# Patient Record
Sex: FEMALE | Race: WHITE | NOT HISPANIC OR LATINO | URBAN - METROPOLITAN AREA
[De-identification: names, ages, dates, MRNs, and addresses within clinical notes are randomized per-mention and may not be internally consistent; named-entity substitution may affect disease eponyms.]

---

## 2017-05-05 ENCOUNTER — EMERGENCY (EMERGENCY)
Facility: HOSPITAL | Age: 37
LOS: 0 days | Discharge: HOME | End: 2017-05-05

## 2017-06-13 ENCOUNTER — EMERGENCY (EMERGENCY)
Facility: HOSPITAL | Age: 37
LOS: 0 days | Discharge: HOME | End: 2017-06-13

## 2017-06-28 DIAGNOSIS — F17.200 NICOTINE DEPENDENCE, UNSPECIFIED, UNCOMPLICATED: ICD-10-CM

## 2017-06-28 DIAGNOSIS — R10.31 RIGHT LOWER QUADRANT PAIN: ICD-10-CM

## 2017-06-28 DIAGNOSIS — N83.201 UNSPECIFIED OVARIAN CYST, RIGHT SIDE: ICD-10-CM

## 2017-06-30 DIAGNOSIS — N83.201 UNSPECIFIED OVARIAN CYST, RIGHT SIDE: ICD-10-CM

## 2017-06-30 DIAGNOSIS — R10.31 RIGHT LOWER QUADRANT PAIN: ICD-10-CM

## 2017-06-30 DIAGNOSIS — Z87.891 PERSONAL HISTORY OF NICOTINE DEPENDENCE: ICD-10-CM

## 2017-06-30 DIAGNOSIS — Z98.890 OTHER SPECIFIED POSTPROCEDURAL STATES: ICD-10-CM

## 2018-01-04 ENCOUNTER — EMERGENCY (EMERGENCY)
Facility: HOSPITAL | Age: 38
LOS: 0 days | Discharge: HOME | End: 2018-01-05

## 2018-01-04 DIAGNOSIS — Z87.42 PERSONAL HISTORY OF OTHER DISEASES OF THE FEMALE GENITAL TRACT: ICD-10-CM

## 2018-01-04 DIAGNOSIS — N83.201 UNSPECIFIED OVARIAN CYST, RIGHT SIDE: ICD-10-CM

## 2018-01-04 DIAGNOSIS — R10.2 PELVIC AND PERINEAL PAIN: ICD-10-CM

## 2018-01-04 DIAGNOSIS — Z98.890 OTHER SPECIFIED POSTPROCEDURAL STATES: ICD-10-CM

## 2018-01-04 DIAGNOSIS — Z90.49 ACQUIRED ABSENCE OF OTHER SPECIFIED PARTS OF DIGESTIVE TRACT: ICD-10-CM

## 2018-01-04 DIAGNOSIS — Z90.721 ACQUIRED ABSENCE OF OVARIES, UNILATERAL: ICD-10-CM

## 2018-09-22 ENCOUNTER — INPATIENT (INPATIENT)
Facility: HOSPITAL | Age: 38
LOS: 0 days | Discharge: HOME | End: 2018-09-23
Attending: OBSTETRICS & GYNECOLOGY | Admitting: OBSTETRICS & GYNECOLOGY

## 2018-09-22 VITALS
RESPIRATION RATE: 18 BRPM | TEMPERATURE: 97 F | HEART RATE: 90 BPM | OXYGEN SATURATION: 99 % | DIASTOLIC BLOOD PRESSURE: 93 MMHG | SYSTOLIC BLOOD PRESSURE: 193 MMHG

## 2018-09-22 DIAGNOSIS — Z90.721 ACQUIRED ABSENCE OF OVARIES, UNILATERAL: Chronic | ICD-10-CM

## 2018-09-22 DIAGNOSIS — N83.209 UNSPECIFIED OVARIAN CYST, UNSPECIFIED SIDE: ICD-10-CM

## 2018-09-22 DIAGNOSIS — Z98.891 HISTORY OF UTERINE SCAR FROM PREVIOUS SURGERY: Chronic | ICD-10-CM

## 2018-09-22 DIAGNOSIS — Z90.49 ACQUIRED ABSENCE OF OTHER SPECIFIED PARTS OF DIGESTIVE TRACT: Chronic | ICD-10-CM

## 2018-09-22 DIAGNOSIS — Z98.890 OTHER SPECIFIED POSTPROCEDURAL STATES: Chronic | ICD-10-CM

## 2018-09-22 LAB
ALBUMIN SERPL ELPH-MCNC: 4.4 G/DL — SIGNIFICANT CHANGE UP (ref 3.5–5.2)
ALP SERPL-CCNC: 61 U/L — SIGNIFICANT CHANGE UP (ref 30–115)
ALT FLD-CCNC: 12 U/L — SIGNIFICANT CHANGE UP (ref 0–41)
ANION GAP SERPL CALC-SCNC: 16 MMOL/L — HIGH (ref 7–14)
ANISOCYTOSIS BLD QL: SLIGHT — SIGNIFICANT CHANGE UP
APPEARANCE UR: CLEAR — SIGNIFICANT CHANGE UP
APTT BLD: 27.3 SEC — SIGNIFICANT CHANGE UP (ref 27–39.2)
AST SERPL-CCNC: 15 U/L — SIGNIFICANT CHANGE UP (ref 0–41)
BASOPHILS # BLD AUTO: 0.05 K/UL — SIGNIFICANT CHANGE UP (ref 0–0.2)
BASOPHILS NFR BLD AUTO: 0.4 % — SIGNIFICANT CHANGE UP (ref 0–1)
BILIRUB SERPL-MCNC: 0.3 MG/DL — SIGNIFICANT CHANGE UP (ref 0.2–1.2)
BILIRUB UR-MCNC: NEGATIVE — SIGNIFICANT CHANGE UP
BUN SERPL-MCNC: 9 MG/DL — LOW (ref 10–20)
CALCIUM SERPL-MCNC: 8.9 MG/DL — SIGNIFICANT CHANGE UP (ref 8.5–10.1)
CHLORIDE SERPL-SCNC: 101 MMOL/L — SIGNIFICANT CHANGE UP (ref 98–110)
CO2 SERPL-SCNC: 23 MMOL/L — SIGNIFICANT CHANGE UP (ref 17–32)
COLOR SPEC: YELLOW — SIGNIFICANT CHANGE UP
CREAT SERPL-MCNC: 0.6 MG/DL — LOW (ref 0.7–1.5)
DACRYOCYTES BLD QL SMEAR: SLIGHT — SIGNIFICANT CHANGE UP
DIFF PNL FLD: NEGATIVE — SIGNIFICANT CHANGE UP
EOSINOPHIL # BLD AUTO: 0.12 K/UL — SIGNIFICANT CHANGE UP (ref 0–0.7)
EOSINOPHIL NFR BLD AUTO: 1.1 % — SIGNIFICANT CHANGE UP (ref 0–8)
GLUCOSE SERPL-MCNC: 108 MG/DL — HIGH (ref 70–99)
GLUCOSE UR QL: NEGATIVE MG/DL — SIGNIFICANT CHANGE UP
HCT VFR BLD CALC: 31.1 % — LOW (ref 37–47)
HCT VFR BLD CALC: 35.4 % — LOW (ref 37–47)
HGB BLD-MCNC: 11 G/DL — LOW (ref 12–16)
HGB BLD-MCNC: 9.8 G/DL — LOW (ref 12–16)
HYPOCHROMIA BLD QL: SLIGHT — SIGNIFICANT CHANGE UP
IMM GRANULOCYTES NFR BLD AUTO: 0.5 % — HIGH (ref 0.1–0.3)
INR BLD: 1.02 RATIO — SIGNIFICANT CHANGE UP (ref 0.65–1.3)
KETONES UR-MCNC: NEGATIVE — SIGNIFICANT CHANGE UP
LEUKOCYTE ESTERASE UR-ACNC: NEGATIVE — SIGNIFICANT CHANGE UP
LYMPHOCYTES # BLD AUTO: 2.55 K/UL — SIGNIFICANT CHANGE UP (ref 1.2–3.4)
LYMPHOCYTES # BLD AUTO: 22.5 % — SIGNIFICANT CHANGE UP (ref 20.5–51.1)
MANUAL SMEAR VERIFICATION: SIGNIFICANT CHANGE UP
MCHC RBC-ENTMCNC: 20.6 PG — LOW (ref 27–31)
MCHC RBC-ENTMCNC: 20.6 PG — LOW (ref 27–31)
MCHC RBC-ENTMCNC: 31.1 G/DL — LOW (ref 32–37)
MCHC RBC-ENTMCNC: 31.5 G/DL — LOW (ref 32–37)
MCV RBC AUTO: 65.5 FL — LOW (ref 81–99)
MCV RBC AUTO: 66.2 FL — LOW (ref 81–99)
MICROCYTES BLD QL: SLIGHT — SIGNIFICANT CHANGE UP
MONOCYTES # BLD AUTO: 0.74 K/UL — HIGH (ref 0.1–0.6)
MONOCYTES NFR BLD AUTO: 6.5 % — SIGNIFICANT CHANGE UP (ref 1.7–9.3)
NEUTROPHILS # BLD AUTO: 7.82 K/UL — HIGH (ref 1.4–6.5)
NEUTROPHILS NFR BLD AUTO: 69 % — SIGNIFICANT CHANGE UP (ref 42.2–75.2)
NITRITE UR-MCNC: NEGATIVE — SIGNIFICANT CHANGE UP
NRBC # BLD: 0 /100 WBCS — SIGNIFICANT CHANGE UP (ref 0–0)
NRBC # BLD: 0 /100 WBCS — SIGNIFICANT CHANGE UP (ref 0–0)
OVALOCYTES BLD QL SMEAR: SLIGHT — SIGNIFICANT CHANGE UP
PH UR: 6 — SIGNIFICANT CHANGE UP (ref 5–8)
PLAT MORPH BLD: NORMAL — SIGNIFICANT CHANGE UP
PLATELET # BLD AUTO: 242 K/UL — SIGNIFICANT CHANGE UP (ref 130–400)
PLATELET # BLD AUTO: 283 K/UL — SIGNIFICANT CHANGE UP (ref 130–400)
POTASSIUM SERPL-MCNC: 4.2 MMOL/L — SIGNIFICANT CHANGE UP (ref 3.5–5)
POTASSIUM SERPL-SCNC: 4.2 MMOL/L — SIGNIFICANT CHANGE UP (ref 3.5–5)
PROT SERPL-MCNC: 7.1 G/DL — SIGNIFICANT CHANGE UP (ref 6–8)
PROT UR-MCNC: NEGATIVE MG/DL — SIGNIFICANT CHANGE UP
PROTHROM AB SERPL-ACNC: 11 SEC — SIGNIFICANT CHANGE UP (ref 9.95–12.87)
RBC # BLD: 4.75 M/UL — SIGNIFICANT CHANGE UP (ref 4.2–5.4)
RBC # BLD: 5.35 M/UL — SIGNIFICANT CHANGE UP (ref 4.2–5.4)
RBC # FLD: 15.4 % — HIGH (ref 11.5–14.5)
RBC # FLD: 15.6 % — HIGH (ref 11.5–14.5)
RBC BLD AUTO: NORMAL — SIGNIFICANT CHANGE UP
SODIUM SERPL-SCNC: 140 MMOL/L — SIGNIFICANT CHANGE UP (ref 135–146)
SP GR SPEC: 1.01 — SIGNIFICANT CHANGE UP (ref 1.01–1.03)
UROBILINOGEN FLD QL: 0.2 MG/DL — SIGNIFICANT CHANGE UP (ref 0.2–0.2)
WBC # BLD: 11.34 K/UL — HIGH (ref 4.8–10.8)
WBC # BLD: 9.19 K/UL — SIGNIFICANT CHANGE UP (ref 4.8–10.8)
WBC # FLD AUTO: 11.34 K/UL — HIGH (ref 4.8–10.8)
WBC # FLD AUTO: 9.19 K/UL — SIGNIFICANT CHANGE UP (ref 4.8–10.8)

## 2018-09-22 RX ORDER — ONDANSETRON 8 MG/1
4 TABLET, FILM COATED ORAL EVERY 8 HOURS
Qty: 0 | Refills: 0 | Status: DISCONTINUED | OUTPATIENT
Start: 2018-09-22 | End: 2018-09-23

## 2018-09-22 RX ORDER — SODIUM CHLORIDE 9 MG/ML
1000 INJECTION INTRAMUSCULAR; INTRAVENOUS; SUBCUTANEOUS
Qty: 0 | Refills: 0 | Status: DISCONTINUED | OUTPATIENT
Start: 2018-09-22 | End: 2018-09-22

## 2018-09-22 RX ORDER — MORPHINE SULFATE 50 MG/1
6 CAPSULE, EXTENDED RELEASE ORAL ONCE
Qty: 0 | Refills: 0 | Status: DISCONTINUED | OUTPATIENT
Start: 2018-09-22 | End: 2018-09-22

## 2018-09-22 RX ORDER — ONDANSETRON 8 MG/1
4 TABLET, FILM COATED ORAL ONCE
Qty: 0 | Refills: 0 | Status: COMPLETED | OUTPATIENT
Start: 2018-09-22 | End: 2018-09-22

## 2018-09-22 RX ORDER — KETOROLAC TROMETHAMINE 30 MG/ML
30 SYRINGE (ML) INJECTION ONCE
Qty: 0 | Refills: 0 | Status: DISCONTINUED | OUTPATIENT
Start: 2018-09-22 | End: 2018-09-22

## 2018-09-22 RX ORDER — SODIUM CHLORIDE 9 MG/ML
1000 INJECTION INTRAMUSCULAR; INTRAVENOUS; SUBCUTANEOUS ONCE
Qty: 0 | Refills: 0 | Status: COMPLETED | OUTPATIENT
Start: 2018-09-22 | End: 2018-09-22

## 2018-09-22 RX ORDER — KETOROLAC TROMETHAMINE 30 MG/ML
30 SYRINGE (ML) INJECTION EVERY 6 HOURS
Qty: 0 | Refills: 0 | Status: DISCONTINUED | OUTPATIENT
Start: 2018-09-22 | End: 2018-09-23

## 2018-09-22 RX ORDER — OXYCODONE AND ACETAMINOPHEN 5; 325 MG/1; MG/1
1 TABLET ORAL EVERY 4 HOURS
Qty: 0 | Refills: 0 | Status: DISCONTINUED | OUTPATIENT
Start: 2018-09-22 | End: 2018-09-23

## 2018-09-22 RX ADMIN — ONDANSETRON 4 MILLIGRAM(S): 8 TABLET, FILM COATED ORAL at 20:11

## 2018-09-22 RX ADMIN — ONDANSETRON 4 MILLIGRAM(S): 8 TABLET, FILM COATED ORAL at 04:50

## 2018-09-22 RX ADMIN — SODIUM CHLORIDE 125 MILLILITER(S): 9 INJECTION INTRAMUSCULAR; INTRAVENOUS; SUBCUTANEOUS at 09:03

## 2018-09-22 RX ADMIN — SODIUM CHLORIDE 500 MILLILITER(S): 9 INJECTION INTRAMUSCULAR; INTRAVENOUS; SUBCUTANEOUS at 02:08

## 2018-09-22 RX ADMIN — OXYCODONE AND ACETAMINOPHEN 1 TABLET(S): 5; 325 TABLET ORAL at 20:11

## 2018-09-22 RX ADMIN — OXYCODONE AND ACETAMINOPHEN 1 TABLET(S): 5; 325 TABLET ORAL at 18:43

## 2018-09-22 RX ADMIN — Medication 30 MILLIGRAM(S): at 02:08

## 2018-09-22 RX ADMIN — ONDANSETRON 4 MILLIGRAM(S): 8 TABLET, FILM COATED ORAL at 02:08

## 2018-09-22 RX ADMIN — OXYCODONE AND ACETAMINOPHEN 1 TABLET(S): 5; 325 TABLET ORAL at 13:21

## 2018-09-22 RX ADMIN — MORPHINE SULFATE 6 MILLIGRAM(S): 50 CAPSULE, EXTENDED RELEASE ORAL at 04:50

## 2018-09-22 NOTE — PROGRESS NOTE ADULT - ASSESSMENT
38 yo P1, hx of L oophorectomy for torsion and R cystectomy with known hx of R ovarian cysts, abdominal pain and pelvic free fluid, likely ruptured hemorrhagic cyst, low suspicion for ovarian torsion, for serial abdominal exams.  -Pain stable  -Percocet now for pain  -Zofran for nausea  -Regular diet  -Vitals  -continue serial abdominal exams  -AM CBC  -Willl re-asses for AM discharge    Will discuss with Dr. Weber and Dr. Keller

## 2018-09-22 NOTE — H&P ADULT - NSHPPHYSICALEXAM_GEN_ALL_CORE
T(F): 96.7 (22 Sep 2018 03:22), Max: 97.4 (22 Sep 2018 01:01)  HR: 57 (22 Sep 2018 03:22) (57 - 90)  BP: 126/67 (22 Sep 2018 03:22) (126/67 - 193/93)  RR: 18 (22 Sep 2018 03:22) (18 - 18)  SpO2: 97% (22 Sep 2018 03:22) (97% - 99%)    Gen: NAD  CVS: RRR, normal S1, S2  Lungs: CTAB  ABd: soft, mild RLQ tenderness, non distended, no R/G/R, no suprapubic or CVA tenderness  LE: no edema or tenderness    Pelvic: small amount of thin white discharge, no bleeding or lesions. No CMT, cervix C/L/P, uterus anteverted, 8w size, no fundal tenderness, no L adnexal tenderness or masses, mild R adnexal tenderness, no masses palpated.

## 2018-09-22 NOTE — H&P ADULT - NSHPLABSRESULTS_GEN_ALL_CORE
11.0   11.34 )-----------( 283      ( 22 Sep 2018 02:06 )             35.4   PT/INR - ( 22 Sep 2018 02:06 )   PT: 11.00 sec;   INR: 1.02 ratio         PTT - ( 22 Sep 2018 02:06 )  PTT:27.3 sec      140  |  101  |  9<L>  ----------------------------<  108<H>  4.2   |  23  |  0.6<L>    Ca    8.9      22 Sep 2018 02:06    TPro  7.1  /  Alb  4.4  /  TBili  0.3  /  DBili  x   /  AST  15  /  ALT  12  /  AlkPhos  61      Urinalysis Basic - ( 22 Sep 2018 01:29 )    Color: Yellow / Appearance: Clear / S.015 / pH: x  Gluc: x / Ketone: Negative  / Bili: Negative / Urobili: 0.2 mg/dL   Blood: x / Protein: Negative mg/dL / Nitrite: Negative   Leuk Esterase: Negative / RBC: x / WBC x   Sq Epi: x / Non Sq Epi: x / Bacteria: x      Meds  0208 Toradol  0450 Morphine  Zofran x2    Imaging  < from: US Pelvis Complete (18 @ 05:48) >  1.  Increased right ovarian enlargement with no torsion. Right ovarian   4.3 cm complex cyst enlarged from 3.1 cm. MRI of the pelvis is   recommended to exclude a cystadenoma.    2.  Free pelvic fluid, new.    3.  Status post left oophorectomy.    < end of copied text >

## 2018-09-22 NOTE — ED ADULT NURSE REASSESSMENT NOTE - NS ED NURSE REASSESS COMMENT FT1
Patient report received from previous RN, patient at this time is resting in bed with no acute distress, OBGYN at bedside with patient for disposition and evaluation, will continue tow ach and assess patient, safety and comfort measures maintained. VS stable, family at bedside.

## 2018-09-22 NOTE — PROGRESS NOTE ADULT - ATTENDING COMMENTS
as above, agree with findings, impression and plan    IMP: Right Ovarian Cyst, Pelvic Pain - stable to improving    Plan:  Pain Management, Serial Exams

## 2018-09-22 NOTE — PROGRESS NOTE ADULT - ASSESSMENT
36 yo P1, hx of L oophorectomy for torsion and R cystectomy with known hx of R ovarian cysts, presents with abdominal pain and pelvic free fluid, likely ruptured hemorrhagic cyst, low suspicion for ovarian torsion, for serial abdominal exams  -Pain stable  -Percocet now for pain  -Zofran for nausea  -Regular diet  -Vitals, IVF  -Will re-asses in 2 hours.     Dr. Weber and Dr. Keller aware.

## 2018-09-22 NOTE — PROGRESS NOTE ADULT - SUBJECTIVE AND OBJECTIVE BOX
PGY 2 Note    Pt seen and examined at bedside, sleeping comfortably at this time. Took percocet and zofran at 2000, pain well controlled now.     Vital Signs Last 24 Hrs  T(F): 98.1 (22 Sep 2018 22:13), Max: 98.1 (22 Sep 2018 22:13)  HR: 59 (22 Sep 2018 22:13) (52 - 62)  BP: 105/61 (22 Sep 2018 22:13) (103/63 - 126/67)  RR: 18 (22 Sep 2018 22:13) (16 - 20)  SpO2: 98% (22 Sep 2018 07:56) (97% - 98%)    Gen: NAD, sleeping comfortably  Abd: soft, improving RLQ tenderness, non distended    Labs  none new    Meds  0230 Toradol, Zofran  0450 Morphine 6mg, Zofran  1300 Percocet  2000 Percocet, Zofran

## 2018-09-22 NOTE — ED PROVIDER NOTE - PSH
No significant past surgical history History of appendectomy    History of left oophorectomy History of appendectomy    History of  section    History of left oophorectomy

## 2018-09-22 NOTE — ED ADULT NURSE NOTE - NSIMPLEMENTINTERV_GEN_ALL_ED
Implemented All Universal Safety Interventions:  [place] to call system. Call bell, personal items and telephone within reach. Instruct patient to call for assistance. Room bathroom lighting operational. Non-slip footwear when patient is off stretcher. Physically safe environment: no spills, clutter or unnecessary equipment. Stretcher in lowest position, wheels locked, appropriate side rails in place.

## 2018-09-22 NOTE — PROGRESS NOTE ADULT - SUBJECTIVE AND OBJECTIVE BOX
PGY 2 Note    Pt seen and examined at bedside, reports pain slightly improved with percocet, however still present.     Vital Signs Last 24 Hrs  T(F): 97.6 (22 Sep 2018 18:19), Max: 97.8 (22 Sep 2018 12:53)  HR: 62 (22 Sep 2018 18:19) (52 - 90)  BP: 114/66 (22 Sep 2018 18:19) (103/63 - 193/93)  RR: 20 (22 Sep 2018 18:19) (16 - 20)  SpO2: 98% (22 Sep 2018 07:56) (97% - 99%)    Gen: NAD, AOOx3  Abd: soft, mild RLQ, non distended, no R/G/R, no CVA or suprapubic tenderness  Pelvic - deferred  LE: no edema or tenderness    Labs                        9.8    9.19  )-----------( 242      ( 22 Sep 2018 13:00 )             31.1     Meds  0230 Toradol, Zofran  0450 Morphine 6mg, Zofran

## 2018-09-22 NOTE — ED PROVIDER NOTE - PHYSICAL EXAMINATION
CONSTITUTIONAL: Well-appearing; well-nourished; in no apparent distress.   EYES: PERRL; EOM intact.   CARDIOVASCULAR: Normal S1, S2; no murmurs, rubs, or gallops.   RESPIRATORY: Normal chest excursion with respiration; breath sounds clear and equal bilaterally; no wheezes, rhonchi, or rales.  GI: + RLQ tenderness. Normal bowel sounds; non-distended; no palpable organomegaly.   MS: No evidence of trauma or deformity. Non-tender to palpation. No scoliosis. No CVA tenderness. Normal ROM in all four extremities; non-tender to palpation; distal pulses are normal.   SKIN: Normal for age and race; warm; dry; good turgor; no apparent lesions or exudate.   NEURO/PSYCH: A & O x 4; grossly unremarkable. mood and manner are appropriate. Grooming and personal hygiene are appropriate. No apparent thoughts of harm to self or others.

## 2018-09-22 NOTE — ED PROVIDER NOTE - ATTENDING CONTRIBUTION TO CARE
37f w a hx of prior ovarian cyst last seen in 1/2018 w R sided ovarian cyst 4.5cm. Pt presents w RLQ/pelvic pain starting 2:30p. Pain is sharp, moderate, constant, no radiating, no exacerbating/alleviating and feels similar to ruptured cyst pain. Pt has hx of appendectomy & prior L oophorectomy for ovarian torsion.    Review of Systems  Constitutional:  No fever or chills.   Eyes:  Negative.   ENMT:  No nasal congestion, discharge, or throat pain.   Cardiac:  No chest pain, syncope, or edema.  Respiratory:  No dyspnea, wheezing, or cough. No hemoptysis.  GI:  No vomiting, diarrhea, melena, or hematochezia.  :  No dysuria or hematuria. No vaginal bleeding/discharge. No new sexual partners or hx of STD.  Musculoskeletal:  No joint swelling, joint pain, or back pain.  Skin:  No skin rash, jaundice, or lesions.  Neuro:  No headache, loss of sensation, or focal weakness.  No change in mental status.     Physical Exam  General: Awake, alert, NAD, WDWN, non-toxic appearing, NCAT  Eyes: PERRL, EOMI, no icterus, lids and conjunctivae are normal  ENT: External inspection normal, pink/moist membranes, pharynx normal  CV: S1S2, regular rate and rhythm, no murmur/gallops/rubs, no JVD, 2+ pulses b/l, no edema/cords/homans, warm/well-perfused  Respiratory: Normal respiratory rate/effort, no respiratory distress, normal voice, speaking full sentences, lungs clear to auscultation b/l, no wheezing/rales/rhonchi, no retractions, no stridor  Abdomen: Soft abdomen, RLQ tender, no distended/guarding/rebound, no CVA tender  : As per PA Sweet: Os closed, no cmt, R adnexal tender, no bleeding, no discharge  Musculoskeletal: FROM all 4 extremities, N/V intact  Neck: FROM neck, supple, no meningismus, trachea midline, no JVD  Integumentary: Color normal for race, warm and dry, no rash  Neuro: Oriented x3, CN 2-12 grossly intact, normal motor, normal sensory  Psych: Oriented x3, mood normal, affect normal     37f w RLQ/pelvic pain and hx of R sided ovarian cyst. --CBC, CMP, lipase, HCG, UA, US r/o torsion. --IV fluids, analgesia/antiemetics as needed, transfer to N site for US eval.

## 2018-09-22 NOTE — CONSULT NOTE ADULT - SUBJECTIVE AND OBJECTIVE BOX
HAIR JARRETT [id number]    HPI: 38 yo P1, LMP 8/21 presents for sudden onset, 10/10, stabbing RLQ pain on Thursday while standing, constant, non radiating, and worsening since then. Pt reports pain worsens with walking, slightly relieved with lying down. Associated with pressure feeling in the abdomen and nausea, no vomiting. Tried motrin and percocet at home, no help. Pt reports feeling similar pain before, not as intense, states she has a known R ovarian cyst, last 3 cm in size in Jan. Pt states she has been managing conservatively for now.      OBhx  GYNhx  PMHx  PSHx    Physical Exam:  Vital Signs Last 24 Hrs  T(C): 35.9 (22 Sep 2018 03:22), Max: 36.3 (22 Sep 2018 01:01)  T(F): 96.7 (22 Sep 2018 03:22), Max: 97.4 (22 Sep 2018 01:01)  HR: 57 (22 Sep 2018 03:22) (57 - 90)  BP: 126/67 (22 Sep 2018 03:22) (126/67 - 193/93)  RR: 18 (22 Sep 2018 03:22) (18 - 18)  SpO2: 97% (22 Sep 2018 03:22) (97% - 99%)              Imaging    Assessment    Plan HAIR JARRETT [id number]    HPI: 38 yo P1, LMP 8/21 presents for sudden onset, 10/10, stabbing RLQ pain on Thursday while standing, constant, non radiating, and worsening since then. Pt reports pain worsens with walking, slightly relieved with lying down. Associated with pressure feeling in the abdomen and nausea, no vomiting. Tried motrin and percocet at home, no help. Pt reports feeling similar pain before, not as intense, states she has a known R ovarian cyst, last 3 cm in size in . Pt states she has been managing conservatively for now.  Denies fevers, chills, CP, SOB, vomiting, diarrhea, dysuria or hematuria. Last BM yesterday, constipated. Last intercourse months ago. Last Po intake 1500 yesterday,    TVUS : Complicated probable hemorrhagic cyst in the right ovary measuring up to 3.1   cm   2017: Right ovarian 4.7 cm hemorrhagic cyst.   2017 1. Multiple right ovarian cyst including 2.8 cm cyst and stable 2.3 cm cyst   with wall calcification.     In ED, pt given toradol at 0230, no help. At 0450, given 6mg morphine, pain now 7/10, still strong with walking. Zofran givenx2 for nausea.    OBhx P1, c/s  GYNhx: Hx of L ovarian torsion s/p oopherectomy at age 14. Multiple cysts s/p cystectomy on R.   PMHx: denies  PSHx: L oopherectomy, R cystectomy, c/s with [facility]  Allergies: none  Meds: none  SHx: denies  FHx: Aunt with ovarian cancer, uncles with colon cancer    Physical Exam:  Vital Signs Last 24 Hrs  T(C): 35.9 (22 Sep 2018 03:22), Max: 36.3 (22 Sep 2018 01:01)  T(F): 96.7 (22 Sep 2018 03:22), Max: 97.4 (22 Sep 2018 01:01)  HR: 57 (22 Sep 2018 03:22) (57 - 90)  BP: 126/67 (22 Sep 2018 03:22) (126/67 - 193/93)  RR: 18 (22 Sep 2018 03:22) (18 - 18)  SpO2: 97% (22 Sep 2018 03:22) (97% - 99%)    Gen: NAD  CVS: RRR, normal S1, S2  Lungs: CTAB  ABd: soft, mild RLQ tenderness, non distended, no R/G/R, no suprapubic or CVA tenderness   Pelvic: small amount of thin white discharge, no bleeding or lesions. No CMT, cervic C/L/P, uterus anteverted, 8w size, no fundal tenderness, no L adnexal tenderness or masses, mild R adnexal tenderness, no masses palpated.   LE: no edema or tenderness    Labs                        11.0   11.34 )-----------( 283      ( 22 Sep 2018 02:06 )             35.4   PT/INR - ( 22 Sep 2018 02:06 )   PT: 11.00 sec;   INR: 1.02 ratio         PTT - ( 22 Sep 2018 02:06 )  PTT:27.3 sec      140  |  101  |  9<L>  ----------------------------<  108<H>  4.2   |  23  |  0.6<L>    Ca    8.9      22 Sep 2018 02:06    TPro  7.1  /  Alb  4.4  /  TBili  0.3  /  DBili  x   /  AST  15  /  ALT  12  /  AlkPhos  61      Urinalysis Basic - ( 22 Sep 2018 01:29 )    Color: Yellow / Appearance: Clear / S.015 / pH: x  Gluc: x / Ketone: Negative  / Bili: Negative / Urobili: 0.2 mg/dL   Blood: x / Protein: Negative mg/dL / Nitrite: Negative   Leuk Esterase: Negative / RBC: x / WBC x   Sq Epi: x / Non Sq Epi: x / Bacteria: x      Meds  0208 Toradol  0450 Morphine  Zofran x2    Imaging  < from: US Pelvis Complete (18 @ 05:48) >  1.  Increased right ovarian enlargement with no torsion. Right ovarian   4.3 cm complex cyst enlarged from 3.1 cm. MRI of the pelvis is   recommended to exclude a cystadenoma.    2.  Free pelvic fluid, new.    3.  Status post left oophorectomy.    < end of copied text >

## 2018-09-22 NOTE — H&P ADULT - ASSESSMENT
36 yo P1, hx of L oophorectomy for torsion and R cystectomy with known hx of R ovarian cysts, presents with abdominal pain and pelvic free fluid, likely ruptured hemorrhagic cyst, low suspicion for ovarian torsion.

## 2018-09-22 NOTE — ED PROVIDER NOTE - NS ED ROS FT
Constitutional: no fever, chills, no recent weight loss, change in appetite or malaise  Eyes: no redness/discharge/pain/vision changes  ENT: no rhinorrhea/ear pain/sore throat  Cardiac: No chest pain, SOB or edema.  Respiratory: No cough or respiratory distress  GI: see HPI. No nausea, vomiting, diarrhea or abdominal pain.  : No dysuria, frequency, urgency or hematuria  MS: no pain to back or extremities, no loss of ROM, no weakness  Neuro: No headache or weakness. No LOC.  Skin: No skin rash.  Endocrine: No history of thyroid disease or diabetes.  Except as documented in the HPI, all other systems are negative. Constitutional: no fever, chills, no recent weight loss, change in appetite or malaise  Eyes: no redness/discharge/pain/vision changes  ENT: no rhinorrhea/ear pain/sore throat  Cardiac: No chest pain, SOB or edema.  Respiratory: No cough or respiratory distress  GI: see HPI. No nausea, vomiting, or diarrhea.  : No dysuria, frequency, urgency or hematuria  MS: no pain to back or extremities, no loss of ROM, no weakness  Neuro: No headache or weakness. No LOC.  Skin: No skin rash.  Endocrine: No history of thyroid disease or diabetes.  Except as documented in the HPI, all other systems are negative.

## 2018-09-22 NOTE — H&P ADULT - PSH
H/O ovarian cystectomy    History of appendectomy    History of  section    History of left oophorectomy

## 2018-09-22 NOTE — ED PROVIDER NOTE - OBJECTIVE STATEMENT
38 yo female hx of ovarian cyst, ovarian torsion (s/p left oophectomy) s/p appendectomy present c/o RLQ pain since 230 pm yesterday. reported pain is constant. took percocet without improvement. reported her pain is similar to her ovarian cyst pain in the past but the pain is more worst. + nausea without vomiting. denies fever/chill/vaginal discharge/bleeding and urinary sxs. denies chest pain/sob.

## 2018-09-22 NOTE — ED PROVIDER NOTE - PROGRESS NOTE DETAILS
care d/w Dr Fraser. Pt to be transferred to N site for US r/o ovarian torsion Pt arrived to north and US ordered upon arrival. Pt reassess and given meds for pain/nausea. US results reviewed and discussed with pt including need for MRI for possible cystadenoma. Pt still in pain, + right pelvic tenderness. OBGYN josed. Patient evaluated, resting comfortably; pending OB consult OB at bedside Ob advised admission for serial abdominal exams; will admit under Dr. Keller's service

## 2018-09-22 NOTE — H&P ADULT - HISTORY OF PRESENT ILLNESS
36 yo P1, LMP 8/21 presents for sudden onset, 10/10, stabbing RLQ pain on Thursday while standing, constant, non radiating, and worsening since then. Pt reports pain worsens with walking, slightly relieved with lying down. Associated with pressure feeling in the abdomen and nausea, no vomiting. Tried motrin and percocet at home, no help. Pt reports feeling similar pain before, not as intense, states she has a known R ovarian cyst, last 3 cm in size in Jan. Pt states she has been managing conservatively for now.  Denies fevers, chills, CP, SOB, vomiting, diarrhea, dysuria or hematuria. Last BM yesterday, constipated. Last intercourse months ago. Last Po intake 1500 yesterday,    TVUS 1/4: Complicated probable hemorrhagic cyst in the right ovary measuring up to 3.1 cm   6/2017: Right ovarian 4.7 cm hemorrhagic cyst.   5/2017 1. Multiple right ovarian cyst including 2.8 cm cyst and stable 2.3 cm cyst with wall calcification.     In ED, pt given toradol at 0230, no help. At 0450, given 6mg morphine, pain now 7/10, still strong with walking. Zofran givenx2 for nausea.    OBhx P1, c/s  GYNhx: Hx of L ovarian torsion s/p oopherectomy at age 14. Multiple cysts s/p cystectomy on R. 38 yo P1, LMP 8/21 presents for sudden onset, 10/10, stabbing RLQ pain on Thursday while standing, constant, non radiating, and worsening since then. Pt reports pain worsens with walking, slightly relieved with lying down. Associated with pressure feeling in the abdomen and nausea, no vomiting. Tried motrin and percocet at home, no help. Pt reports feeling similar pain before, not as intense, states she has a known R ovarian cyst, last 3 cm in size in Jan. Pt states she has been managing conservatively for now.  Denies fevers, chills, CP, SOB, vomiting, diarrhea, dysuria or hematuria. Last BM yesterday, constipated. Last intercourse months ago. Last Po intake 1500 yesterday,    TVUS 1/4: Complicated probable hemorrhagic cyst in the right ovary measuring up to 3.1 cm   6/2017: Right ovarian 4.7 cm hemorrhagic cyst.   5/2017 1. Multiple right ovarian cyst including 2.8 cm cyst and stable 2.3 cm cyst with wall calcification.     In ED, pt given toradol at 0230, no help. At 0450, given 6mg morphine, pain now 7/10, still strong with walking. Zofran givenx2 for nausea.    OBhx P1, c/s  GYNhx: Hx of L ovarian torsion s/p oopherectomy at age 14. Multiple cysts s/p cystectomy on R.     In ED, pt given toradol at 0230, no help. At 0450, given 6mg morphine, pain now 7/10, still strong with walking. Zofran givenx2 for nausea.

## 2018-09-22 NOTE — H&P ADULT - FAMILY HISTORY
Aunt  Still living? Unknown  Family history of ovarian cancer, Age at diagnosis: Age Unknown     Uncle  Still living? Unknown  Family history of colon cancer, Age at diagnosis: Age Unknown

## 2018-09-22 NOTE — PROGRESS NOTE ADULT - SUBJECTIVE AND OBJECTIVE BOX
PGY 2 Note    Pt seen and examined at bedside, reports pain is stable since last seen this AM, continues to have some pain with walking. No pain medications since Morphine at 0450.    Vital Signs Last 24 Hrs  T(F): 96.7 (22 Sep 2018 03:22), Max: 97.4 (22 Sep 2018 01:01)  HR: 62 (22 Sep 2018 07:56) (57 - 90)  BP: 109/57 (22 Sep 2018 07:56) (109/57 - 193/93)  RR: 16 (22 Sep 2018 07:56) (16 - 18)  SpO2: 98% (22 Sep 2018 07:56) (97% - 99%)    Gen: NAD, AOOx3  Abd: soft, mild RLQ, non distended, no R/G/R, no CVA or suprapubic tenderness  Pelvic - deferred  LE: no edema or tenderness    Labs  CBC pending    Meds  0230 Toradol, Zofran  0450 Morphine 6mg, Zofran

## 2018-09-22 NOTE — PROGRESS NOTE ADULT - ASSESSMENT
38 yo P1, hx of L oophorectomy for torsion and R cystectomy with known hx of R ovarian cysts, presents with abdominal pain and pelvic free fluid, likely ruptured hemorrhagic cyst, low suspicion for ovarian torsion, for serial abdominal exams  -Pain stable  -Percocet now for pain  -Zofran for nausea  -Regular diet  -Vitals  -continue serial abdominal exams  -AM CBC  -Willl re-asses for AM discharge    Dr. Weber and Dr. Keller aware.

## 2018-09-22 NOTE — H&P ADULT - ATTENDING COMMENTS
IMP: Pelvic Pain, Right Ovarian Cyst - ruptured vs leaking    Plan: Admit to GYN, Pain Management, Serial exams

## 2018-09-22 NOTE — H&P ADULT - PROBLEM SELECTOR PLAN 1
-Admit  -repeat CBC  -Serial abdominal exams  -Pain mgt  -NPO, IV fluids  -Regular activity  -VS per routine    Discussed with Dr. Weber and Dr. Keller.

## 2018-09-23 VITALS
HEART RATE: 63 BPM | DIASTOLIC BLOOD PRESSURE: 73 MMHG | SYSTOLIC BLOOD PRESSURE: 120 MMHG | RESPIRATION RATE: 20 BRPM | TEMPERATURE: 98 F

## 2018-09-23 LAB
HCT VFR BLD CALC: 32.5 % — LOW (ref 37–47)
HGB BLD-MCNC: 10.2 G/DL — LOW (ref 12–16)
MCHC RBC-ENTMCNC: 20.7 PG — LOW (ref 27–31)
MCHC RBC-ENTMCNC: 31.4 G/DL — LOW (ref 32–37)
MCV RBC AUTO: 66.1 FL — LOW (ref 81–99)
NRBC # BLD: 0 /100 WBCS — SIGNIFICANT CHANGE UP (ref 0–0)
PLATELET # BLD AUTO: 258 K/UL — SIGNIFICANT CHANGE UP (ref 130–400)
RBC # BLD: 4.92 M/UL — SIGNIFICANT CHANGE UP (ref 4.2–5.4)
RBC # FLD: 15.6 % — HIGH (ref 11.5–14.5)
WBC # BLD: 8.37 K/UL — SIGNIFICANT CHANGE UP (ref 4.8–10.8)
WBC # FLD AUTO: 8.37 K/UL — SIGNIFICANT CHANGE UP (ref 4.8–10.8)

## 2018-09-23 RX ORDER — ACETAMINOPHEN 500 MG
650 TABLET ORAL ONCE
Qty: 0 | Refills: 0 | Status: COMPLETED | OUTPATIENT
Start: 2018-09-23 | End: 2018-09-23

## 2018-09-23 RX ADMIN — Medication 650 MILLIGRAM(S): at 11:43

## 2018-09-23 NOTE — PROGRESS NOTE ADULT - SUBJECTIVE AND OBJECTIVE BOX
PGY 2 Note    Pt seen at bedside, resting comfortably, fast asleep. No acute events overnight.     Vital Signs Last 24 Hrs  T(F): 98.1 (22 Sep 2018 22:13), Max: 98.1 (22 Sep 2018 22:13)  HR: 59 (22 Sep 2018 22:13) (52 - 62)  BP: 105/61 (22 Sep 2018 22:13) (103/63 - 126/67)  BP(mean): --  RR: 18 (22 Sep 2018 22:13) (16 - 20)  SpO2: 98% (22 Sep 2018 07:56) (97% - 98%)    Gen: NAD  Abd/pelvic - deferred as pt asleep.     Labs  none new    Meds  0230 Toradol, Zofran  0450 Morphine 6mg, Zofran  1300 Percocet  2000 Percocet, Zofran

## 2018-09-23 NOTE — DISCHARGE NOTE ADULT - LAUNCH MEDICATION RECONCILIATION
Patient medicated with ordered PO prednisone. Patient tolerated well. Ordered nebulizer treatment completing at this time. Patient tolerating well. <<-----Click here for Discharge Medication Review

## 2018-09-23 NOTE — DISCHARGE NOTE ADULT - PATIENT PORTAL LINK FT
You can access the [facility] Patient Portal, offered by [facility], by registering with the following website: [email or web address]

## 2018-09-23 NOTE — DISCHARGE NOTE ADULT - HOSPITAL COURSE
37y admitted for serial abdominal exam and labs due to RLQ pain suspicious for right ovarian cyst rupture. Pain got better and H/H was stable with normal vitals, always hemodinamically stable. She had an uncomplicated course inhouse and was discharged home on 9/23 ambulating, voiding well, tolerating regular diet, good pain control on PO meds.  Will follow with Dr Keller in the office in 2weeks.   Pain, torsion and bleeding precautions given.    Vital Signs  T(F): 97.8 (23 Sep 2018 06:14), Max: 98.1 (22 Sep 2018 22:13)  HR: 63 (23 Sep 2018 06:14) (52 - 63)  BP: 120/73 (23 Sep 2018 06:14) (103/63 - 120/73)  RR: 20 (23 Sep 2018 06:14) (18 - 20)                          10.2   8.37  )-----------( 258      ( 23 Sep 2018 07:43 )             32.5

## 2018-09-23 NOTE — PROGRESS NOTE ADULT - ASSESSMENT
37y P1 with now resolved RLQ pain, likely ruptured hemorrhagic cyst, hemodynamically stable    - f/u AM labs, if stable will d/c home  - pain management PRN      Will discuss with Dr Keller

## 2018-09-23 NOTE — DISCHARGE NOTE ADULT - CARE PLAN
Principal Discharge DX:	Ruptured cyst of ovary  Goal:	full recovery  Assessment and plan of treatment:	Take pain medications as needed tylenol (650mg every 6hours as needed) or motrim (600mg every 6hours as needed). Percocet was sent to your pharmacy in case you need stronger medication for pain.   If fever >100.4F, severe abdominal pain or excessive bleeding (soaking through two thick full-size sanitary pads per hour for two consecutive hours), call your doctor and/or go to the Emergency Department.

## 2018-09-23 NOTE — PROGRESS NOTE ADULT - SUBJECTIVE AND OBJECTIVE BOX
PGY4 GYN Progress Note    #RLQ pain - ovarian cysts - possible ruptured hemorrhagic cyst     Subjective: Patient is doing well. Slept well overnight. Last pain medication at 2000 (PO percocet). At the moment reports no pain, only when she moves, but much improved from yesterday. Patient is ambulating, voiding well. She is tolerating regular diet.    Physical exam:    Vital Signs Last 24 Hrs  T(C): 36.6 (23 Sep 2018 06:14), Max: 36.7 (22 Sep 2018 22:13)  T(F): 97.8 (23 Sep 2018 06:14), Max: 98.1 (22 Sep 2018 22:13)  HR: 63 (23 Sep 2018 06:14) (52 - 63)  BP: 120/73 (23 Sep 2018 06:14) (103/63 - 120/73)  RR: 20 (23 Sep 2018 06:14) (18 - 20)    Gen: NAD, alert and oriented  Abdomen: BS+, soft, not tender, not distended, no R/G/R   Pelvic: deferred  Ext: No calf tenderness or edema      Meds:     ondansetron    Tablet   4 milliGRAM(s) Oral (09-22-18 @ 20:11)    oxyCODONE    5 mG/acetaminophen 325 mG   1 Tablet(s) Oral (09-22-18 @ 20:11)   1 Tablet(s) Oral (09-22-18 @ 13:21)    sodium chloride 0.9%.   125 mL/Hr IV Continuous (09-22-18 @ 08:33)        LABS: AM labs pending                        9.8    9.19  )-----------( 242      ( 22 Sep 2018 13:00 )             31.1                         11.0   11.34 )-----------( 283      ( 22 Sep 2018 02:06 )             35.4       09-22-18 @ 02:06      140  |  101  |  9<L>  ----------------------------<  108<H>  4.2   |  23  |  0.6<L>        Ca    8.9      22 Sep 2018 02:06    TPro  7.1  /  Alb  4.4  /  TBili  0.3  /  DBili  x   /  AST  15  /  ALT  12  /  AlkPhos  61  09-22-18 @ 02:06

## 2018-09-23 NOTE — DISCHARGE NOTE ADULT - PLAN OF CARE
full recovery Take pain medications as needed tylenol (650mg every 6hours as needed) or motrim (600mg every 6hours as needed). Percocet was sent to your pharmacy in case you need stronger medication for pain.   If fever >100.4F, severe abdominal pain or excessive bleeding (soaking through two thick full-size sanitary pads per hour for two consecutive hours), call your doctor and/or go to the Emergency Department.

## 2018-09-23 NOTE — PROGRESS NOTE ADULT - ATTENDING COMMENTS
as above, pt seen at bedside, agree with findings, impression and plan    IMP:  Pelvic Pain - Improved, Ovarian Cyst - likely ruptured    Plan: d/c home, pain meds, f/u office - 2 wks

## 2018-09-23 NOTE — DISCHARGE NOTE ADULT - CARE PROVIDER_API CALL
Russ Keller), Obstetrics and Gynecology  [address] [place] [address]  Phone: [phone]  Fax: [phone]

## 2018-09-23 NOTE — PROGRESS NOTE ADULT - ASSESSMENT
36 yo P1, hx of L oophorectomy for torsion and R cystectomy with known hx of R ovarian cysts, abdominal pain and pelvic free fluid, likely ruptured hemorrhagic cyst with low suspicion for ovarian torsion, admitted for serial abdominal exams, pain improving.  -Pain improving  -Percocet now for pain  -Zofran for nausea  -Regular diet  -continue serial abdominal exams  -AM CBC  -Willl re-asses for AM discharge    Dr. Weber aware, will inform Dr. Keller

## 2018-09-23 NOTE — DISCHARGE NOTE ADULT - MEDICATION SUMMARY - MEDICATIONS TO TAKE
I will START or STAY ON the medications listed below when I get home from the hospital:    oxyCODONE-acetaminophen 5 mg-325 mg oral tablet  -- 1 tab(s) by mouth every 4 hours, As needed MDD:12tabs  -- Indication: For for strong pain

## 2018-09-26 DIAGNOSIS — Z90.721 ACQUIRED ABSENCE OF OVARIES, UNILATERAL: ICD-10-CM

## 2018-09-26 DIAGNOSIS — N83.201 UNSPECIFIED OVARIAN CYST, RIGHT SIDE: ICD-10-CM

## 2018-09-26 DIAGNOSIS — R10.9 UNSPECIFIED ABDOMINAL PAIN: ICD-10-CM

## 2018-09-26 DIAGNOSIS — Z28.21 IMMUNIZATION NOT CARRIED OUT BECAUSE OF PATIENT REFUSAL: ICD-10-CM

## 2018-09-26 DIAGNOSIS — Z80.41 FAMILY HISTORY OF MALIGNANT NEOPLASM OF OVARY: ICD-10-CM

## 2018-09-26 DIAGNOSIS — F17.210 NICOTINE DEPENDENCE, CIGARETTES, UNCOMPLICATED: ICD-10-CM

## 2018-09-26 DIAGNOSIS — Z80.0 FAMILY HISTORY OF MALIGNANT NEOPLASM OF DIGESTIVE ORGANS: ICD-10-CM

## 2022-03-25 NOTE — ED ADULT NURSE NOTE - NSSISCREENINGQ4_ED_A_ED
No (1) obesity (BMI greater than 25) Spiral Flap Text: The defect edges were debeveled with a #15 scalpel blade.  Given the location of the defect, shape of the defect and the proximity to free margins a spiral flap was deemed most appropriate.  Using a sterile surgical marker, an appropriate rotation flap was drawn incorporating the defect and placing the expected incisions within the relaxed skin tension lines where possible. The area thus outlined was incised deep to adipose tissue with a #15 scalpel blade.  The skin margins were undermined to an appropriate distance in all directions utilizing iris scissors.

## 2023-08-28 NOTE — ED ADULT NURSE NOTE - NS PRO PASSIVE SMOKE EXP
Sent to patient portal.  Recent left leg ultrasound result is normal.  Please follow up with physical therapy .  Also you might benefit from seeing a varicose veins service as we have discussed during visit.  Take care and stay safe.
No

## 2024-05-20 NOTE — ED ADULT NURSE NOTE - NS ED NURSE REPORT GIVEN DT
Patient found supine in bed with +IV lock, +external catheter, +bandage lumbar spine. PT/OT donned LSO when patient was seated on EOB. 22-Sep-2018 12:19